# Patient Record
Sex: MALE | Race: WHITE | NOT HISPANIC OR LATINO | Employment: FULL TIME | URBAN - METROPOLITAN AREA
[De-identification: names, ages, dates, MRNs, and addresses within clinical notes are randomized per-mention and may not be internally consistent; named-entity substitution may affect disease eponyms.]

---

## 2018-04-09 ENCOUNTER — APPOINTMENT (OUTPATIENT)
Dept: RADIOLOGY | Facility: MEDICAL CENTER | Age: 23
End: 2018-04-09
Attending: STUDENT IN AN ORGANIZED HEALTH CARE EDUCATION/TRAINING PROGRAM
Payer: COMMERCIAL

## 2018-04-09 ENCOUNTER — HOSPITAL ENCOUNTER (EMERGENCY)
Facility: MEDICAL CENTER | Age: 23
End: 2018-04-09
Attending: EMERGENCY MEDICINE
Payer: COMMERCIAL

## 2018-04-09 VITALS
BODY MASS INDEX: 26.56 KG/M2 | HEART RATE: 98 BPM | DIASTOLIC BLOOD PRESSURE: 74 MMHG | OXYGEN SATURATION: 98 % | RESPIRATION RATE: 16 BRPM | SYSTOLIC BLOOD PRESSURE: 136 MMHG | TEMPERATURE: 98.3 F | HEIGHT: 74 IN | WEIGHT: 207 LBS

## 2018-04-09 DIAGNOSIS — S62.339A CLOSED BOXER'S FRACTURE, INITIAL ENCOUNTER: ICD-10-CM

## 2018-04-09 PROCEDURE — 302874 HCHG BANDAGE ACE 2 OR 3""

## 2018-04-09 PROCEDURE — 99284 EMERGENCY DEPT VISIT MOD MDM: CPT

## 2018-04-09 PROCEDURE — 29125 APPL SHORT ARM SPLINT STATIC: CPT

## 2018-04-09 PROCEDURE — 73130 X-RAY EXAM OF HAND: CPT | Mod: RT

## 2018-04-09 ASSESSMENT — PAIN SCALES - GENERAL
PAINLEVEL_OUTOF10: 5
PAINLEVEL_OUTOF10: 6

## 2018-04-09 NOTE — ED NOTES
Pt ambulated from lobby to room with a steady gait. Pt is accompanied by father. Pt resents for complaints as stated in the triage noted. Pt states he was wrestling with a family member x7 days ago and hit the wall with his R hand. Pt c/o pain primarily to the R 5th metacarpals. Pt states pain radiates up to the wrist and up the ulnar side of the R arm. Pt states to have increase in pain with palpation, movement and use of the R hand. Strong radial pulse noted. CMS intact. Cap refill <3 seconds. Swelling noted to the medial aspect of the R hand.

## 2018-04-09 NOTE — ED TRIAGE NOTES
Ambulatory to triage with   Chief Complaint   Patient presents with   • Hand Injury     Injured hand rough housing 7 days ago. C/o constant pain and swelling. 2+ edema. CMS intact.

## 2018-04-09 NOTE — PROGRESS NOTES
Pt verbalizes understanding of discharge and follow-up instructions.  VSS.  All questions answered.  Ambulates to discharge with steady gait.  Splint applied by ed tech.  Positive cms after splinting.

## 2018-04-09 NOTE — ED PROVIDER NOTES
"ED Provider Note    Scribed for Monica Diaz M.D. by Radha Patel. 4/9/2018  2:32 PM    Primary care provider: Pcp Pt States None  Means of arrival: Walk-in  History obtained from: Patient  History limited by: None    CHIEF COMPLAINT  Chief Complaint   Patient presents with   • Hand Injury     Injured hand rough housing 7 days ago. C/o constant pain and swelling. 2+ edema. CMS intact.        HPI  Kurt White is a 22 y.o. male who presents to the Emergency Department for evaluation of right hand injury sustained one week ago while boxing drunk with his cousin. He describes accidentally hitting the corner of a wall. Patient endorses swelling to this extremity with pain radiating up the wrist. He returned to work at [employer] shortly after sustaining the injury.       REVIEW OF SYSTEMS  Musculoskeletal: Swelling and pain to right hand    See history of present illness. E.     PAST MEDICAL HISTORY   has a past medical history of Rib fracture and Wrist fracture.    SURGICAL HISTORY   has a past surgical history that includes appendectomy.    SOCIAL HISTORY  Social History   Substance Use Topics   • Smoking status: Current Some Day Smoker     Packs/day: 0.50     Years: 6.00     Types: Cigarettes   • Smokeless tobacco: Never Used   • Alcohol use No      History   Drug Use   • Types: Inhaled     Comment: Marijuana       FAMILY HISTORY  History reviewed. No pertinent family history.    CURRENT MEDICATIONS  Home Medications     Reviewed by Nery Grimes R.N. (Registered Nurse) on 04/09/18 at 1422  Med List Status: Complete   Medication Last Dose Status        Patient Vinny Taking any Medications                       ALLERGIES  Allergies   Allergen Reactions   • Sulfa Drugs Rash       PHYSICAL EXAM  VITAL SIGNS: /68   Pulse (!) 113   Temp 36.8 °C (98.3 °F)   Resp 16   Ht 1.88 m (6' 2\")   Wt 93.9 kg (207 lb)   SpO2 98%   BMI 26.58 kg/m²     Constitutional: No distress  Skin: Warm, " dry  Musculoskeletal: Swelling and tenderness over the 4th and 5th metacarpal bone.  Vascular: warm to touch good capillary refill   Neurologic: distally neurovascularly intact  Psychiatric: Affect normal      DIAGNOSTIC STUDIES/PROCEDURES      RADIOLOGY  DX-HAND 3+ RIGHT   Final Result      Angulated fracture of the distal fifth metacarpal with overlying soft tissue swelling.      The radiologist's interpretation of all radiological studies have been reviewed by me.    COURSE & MEDICAL DECISION MAKING  Nursing notes, VS, PMSFHx reviewed in chart.    2:32 PM - Patient seen and examined at bedside. Ordered DX Right Hand to evaluate his symptoms.     I independently evaluated the patient and repeated the important components of the history and physical.  I discussed the management with the resident.  I have reviewed and agree with the pertinent clinical information as above including history, exam, study findings and recommendations.      4:26 PM the patient has a 5th metacarpal fracture with angulation. He will be placed in a splint and referred to outpatient orthopedics, Dr. Tony for follow-up.    Brendan Tony M.D.  [address] [facility] [address]  [phone]    Call in 1 day  to establish care, for recheck        FINAL IMPRESSION  1. Closed boxer's fracture, initial encounter          Radha FRIED ([place]), am scribing for, and in the presence of, Monica Diaz M.D..    Electronically signed by: Radha Patel (Dario), 4/9/2018    Monica FRIED M.D. personally performed the services described in this documentation, as scribed by Radha Patel in my presence, and it is both accurate and complete.    The note accurately reflects work and decisions made by me.  Monica Diaz  4/9/2018  4:28 PM

## 2018-09-11 ENCOUNTER — HOSPITAL ENCOUNTER (OUTPATIENT)
Dept: HOSPITAL 8 - ED | Age: 23
Setting detail: OBSERVATION
LOS: 9 days | Discharge: HOME | End: 2018-09-20
Attending: FAMILY MEDICINE | Admitting: FAMILY MEDICINE
Payer: COMMERCIAL

## 2018-09-11 VITALS — BODY MASS INDEX: 23.2 KG/M2 | WEIGHT: 180.78 LBS | HEIGHT: 74 IN

## 2018-09-11 DIAGNOSIS — F14.10: ICD-10-CM

## 2018-09-11 DIAGNOSIS — F32.9: ICD-10-CM
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
## 2018-09-11 DIAGNOSIS — T14.91XA: Primary | ICD-10-CM
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
## 2018-09-11 DIAGNOSIS — I10: ICD-10-CM
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
## 2018-09-11 DIAGNOSIS — F23: ICD-10-CM

## 2018-09-11 DIAGNOSIS — F10.129: ICD-10-CM

## 2018-09-11 DIAGNOSIS — G89.29: ICD-10-CM
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
## 2018-09-11 DIAGNOSIS — F17.210: ICD-10-CM

## 2018-09-11 DIAGNOSIS — Z91.5: ICD-10-CM

## 2018-09-11 LAB
ALBUMIN SERPL-MCNC: 3.9 G/DL (ref 3.4–5)
ALP SERPL-CCNC: 62 U/L (ref 45–117)
ALT SERPL-CCNC: 33 U/L (ref 12–78)
ANION GAP SERPL CALC-SCNC: 7 MMOL/L (ref 5–15)
APAP SERPL-MCNC: < 2 MCG/ML (ref 10–30)
BASOPHILS # BLD AUTO: 0.02 X10^3/UL (ref 0–0.1)
BASOPHILS NFR BLD AUTO: 0 % (ref 0–1)
BILIRUB SERPL-MCNC: 0.2 MG/DL (ref 0.2–1)
CALCIUM SERPL-MCNC: 8.3 MG/DL (ref 8.5–10.1)
CHLORIDE SERPL-SCNC: 111 MMOL/L (ref 98–107)
CREAT SERPL-MCNC: 1.05 MG/DL (ref 0.7–1.3)
CULTURE INDICATED?: NO
EOSINOPHIL # BLD AUTO: 0.19 X10^3/UL (ref 0–0.4)
EOSINOPHIL NFR BLD AUTO: 2 % (ref 1–7)
ERYTHROCYTE [DISTWIDTH] IN BLOOD BY AUTOMATED COUNT: 14 % (ref 9.4–14.8)
LYMPHOCYTES # BLD AUTO: 3.54 X10^3/UL (ref 1–3.4)
LYMPHOCYTES NFR BLD AUTO: 44 % (ref 22–44)
MCH RBC QN AUTO: 28.6 PG (ref 27.5–34.5)
MCHC RBC AUTO-ENTMCNC: 34.7 G/DL (ref 33.2–36.2)
MCV RBC AUTO: 82.3 FL (ref 81–97)
MD: NO
MICROSCOPIC: (no result)
MONOCYTES # BLD AUTO: 0.33 X10^3/UL (ref 0.2–0.8)
MONOCYTES NFR BLD AUTO: 4 % (ref 2–9)
NEUTROPHILS # BLD AUTO: 3.93 X10^3/UL (ref 1.8–6.8)
NEUTROPHILS NFR BLD AUTO: 49 % (ref 42–75)
PLATELET # BLD AUTO: 220 X10^3/UL (ref 130–400)
PMV BLD AUTO: 8.2 FL (ref 7.4–10.4)
PROT SERPL-MCNC: 7.3 G/DL (ref 6.4–8.2)
RBC # BLD AUTO: 5.14 X10^6/UL (ref 4.38–5.82)
VANCOMYCIN TROUGH SERPL-MCNC: 2.4 MG/DL (ref 2.8–20)

## 2018-09-11 PROCEDURE — 99285 EMERGENCY DEPT VISIT HI MDM: CPT

## 2018-09-11 PROCEDURE — 80329 ANALGESICS NON-OPIOID 1 OR 2: CPT

## 2018-09-11 PROCEDURE — 85025 COMPLETE CBC W/AUTO DIFF WBC: CPT

## 2018-09-11 PROCEDURE — G0480 DRUG TEST DEF 1-7 CLASSES: HCPCS

## 2018-09-11 PROCEDURE — 80053 COMPREHEN METABOLIC PANEL: CPT

## 2018-09-11 PROCEDURE — 36415 COLL VENOUS BLD VENIPUNCTURE: CPT

## 2018-09-11 PROCEDURE — G0378 HOSPITAL OBSERVATION PER HR: HCPCS

## 2018-09-11 PROCEDURE — 80307 DRUG TEST PRSMV CHEM ANLYZR: CPT

## 2018-09-11 PROCEDURE — 81003 URINALYSIS AUTO W/O SCOPE: CPT

## 2018-09-11 PROCEDURE — 96372 THER/PROPH/DIAG INJ SC/IM: CPT

## 2018-09-11 RX ADMIN — NICOTINE SCH PATCH: 21 PATCH, EXTENDED RELEASE TRANSDERMAL at 16:38

## 2018-09-12 VITALS — DIASTOLIC BLOOD PRESSURE: 90 MMHG | SYSTOLIC BLOOD PRESSURE: 137 MMHG

## 2018-09-12 VITALS — DIASTOLIC BLOOD PRESSURE: 84 MMHG | SYSTOLIC BLOOD PRESSURE: 143 MMHG

## 2018-09-12 RX ADMIN — IBUPROFEN PRN MG: 600 TABLET ORAL at 16:00

## 2018-09-12 RX ADMIN — NICOTINE SCH PATCH: 21 PATCH, EXTENDED RELEASE TRANSDERMAL at 14:24

## 2018-09-13 VITALS — DIASTOLIC BLOOD PRESSURE: 75 MMHG | SYSTOLIC BLOOD PRESSURE: 126 MMHG

## 2018-09-13 VITALS — SYSTOLIC BLOOD PRESSURE: 135 MMHG | DIASTOLIC BLOOD PRESSURE: 77 MMHG

## 2018-09-13 RX ADMIN — IBUPROFEN PRN MG: 600 TABLET ORAL at 10:43

## 2018-09-13 RX ADMIN — NICOTINE SCH PATCH: 21 PATCH, EXTENDED RELEASE TRANSDERMAL at 13:54

## 2018-09-13 RX ADMIN — ZIPRASIDONE HCL PRN MG: 20 CAPSULE ORAL at 20:13

## 2018-09-14 VITALS — DIASTOLIC BLOOD PRESSURE: 80 MMHG | SYSTOLIC BLOOD PRESSURE: 125 MMHG

## 2018-09-14 VITALS — DIASTOLIC BLOOD PRESSURE: 84 MMHG | SYSTOLIC BLOOD PRESSURE: 130 MMHG

## 2018-09-14 RX ADMIN — IBUPROFEN PRN MG: 600 TABLET ORAL at 16:08

## 2018-09-14 RX ADMIN — NICOTINE SCH PATCH: 21 PATCH, EXTENDED RELEASE TRANSDERMAL at 13:31

## 2018-09-15 VITALS — SYSTOLIC BLOOD PRESSURE: 132 MMHG | DIASTOLIC BLOOD PRESSURE: 81 MMHG

## 2018-09-15 VITALS — DIASTOLIC BLOOD PRESSURE: 66 MMHG | SYSTOLIC BLOOD PRESSURE: 126 MMHG

## 2018-09-15 RX ADMIN — ZIPRASIDONE HCL PRN MG: 20 CAPSULE ORAL at 17:25

## 2018-09-15 RX ADMIN — NICOTINE SCH PATCH: 21 PATCH, EXTENDED RELEASE TRANSDERMAL at 14:50

## 2018-09-16 VITALS — DIASTOLIC BLOOD PRESSURE: 71 MMHG | SYSTOLIC BLOOD PRESSURE: 117 MMHG

## 2018-09-16 VITALS — DIASTOLIC BLOOD PRESSURE: 82 MMHG | SYSTOLIC BLOOD PRESSURE: 132 MMHG

## 2018-09-16 RX ADMIN — ZIPRASIDONE HCL PRN MG: 20 CAPSULE ORAL at 08:38

## 2018-09-16 RX ADMIN — NICOTINE SCH PATCH: 21 PATCH, EXTENDED RELEASE TRANSDERMAL at 15:23

## 2018-09-17 VITALS — SYSTOLIC BLOOD PRESSURE: 117 MMHG | DIASTOLIC BLOOD PRESSURE: 73 MMHG

## 2018-09-17 VITALS — DIASTOLIC BLOOD PRESSURE: 70 MMHG | SYSTOLIC BLOOD PRESSURE: 114 MMHG

## 2018-09-17 RX ADMIN — ZIPRASIDONE HCL PRN MG: 20 CAPSULE ORAL at 15:37

## 2018-09-17 RX ADMIN — NICOTINE SCH PATCH: 21 PATCH, EXTENDED RELEASE TRANSDERMAL at 15:31

## 2018-09-17 RX ADMIN — ZIPRASIDONE HCL PRN MG: 20 CAPSULE ORAL at 20:17

## 2018-09-18 VITALS — SYSTOLIC BLOOD PRESSURE: 99 MMHG | DIASTOLIC BLOOD PRESSURE: 65 MMHG

## 2018-09-18 VITALS — SYSTOLIC BLOOD PRESSURE: 129 MMHG | DIASTOLIC BLOOD PRESSURE: 79 MMHG

## 2018-09-18 RX ADMIN — NICOTINE SCH PATCH: 21 PATCH, EXTENDED RELEASE TRANSDERMAL at 15:02

## 2018-09-18 RX ADMIN — ZIPRASIDONE HCL PRN MG: 20 CAPSULE ORAL at 13:40

## 2018-09-18 RX ADMIN — ZIPRASIDONE HCL PRN MG: 20 CAPSULE ORAL at 19:40

## 2018-09-19 VITALS — SYSTOLIC BLOOD PRESSURE: 125 MMHG | DIASTOLIC BLOOD PRESSURE: 77 MMHG

## 2018-09-19 VITALS — SYSTOLIC BLOOD PRESSURE: 122 MMHG | DIASTOLIC BLOOD PRESSURE: 76 MMHG

## 2018-09-19 RX ADMIN — NICOTINE SCH PATCH: 21 PATCH, EXTENDED RELEASE TRANSDERMAL at 15:06

## 2018-09-19 RX ADMIN — ZIPRASIDONE HCL PRN MG: 20 CAPSULE ORAL at 08:30

## 2018-09-19 RX ADMIN — ZIPRASIDONE HCL PRN MG: 20 CAPSULE ORAL at 20:22

## 2018-09-20 VITALS — SYSTOLIC BLOOD PRESSURE: 128 MMHG

## 2019-01-29 ENCOUNTER — HOSPITAL ENCOUNTER (EMERGENCY)
Facility: MEDICAL CENTER | Age: 24
End: 2019-01-29
Attending: EMERGENCY MEDICINE
Payer: COMMERCIAL

## 2019-01-29 VITALS
WEIGHT: 214.73 LBS | DIASTOLIC BLOOD PRESSURE: 91 MMHG | SYSTOLIC BLOOD PRESSURE: 132 MMHG | HEART RATE: 107 BPM | OXYGEN SATURATION: 98 % | RESPIRATION RATE: 16 BRPM | HEIGHT: 74 IN | TEMPERATURE: 96.8 F | BODY MASS INDEX: 27.56 KG/M2

## 2019-01-29 DIAGNOSIS — S39.012A STRAIN OF LUMBAR REGION, INITIAL ENCOUNTER: ICD-10-CM

## 2019-01-29 PROCEDURE — A9270 NON-COVERED ITEM OR SERVICE: HCPCS | Performed by: EMERGENCY MEDICINE

## 2019-01-29 PROCEDURE — 99283 EMERGENCY DEPT VISIT LOW MDM: CPT

## 2019-01-29 PROCEDURE — 700111 HCHG RX REV CODE 636 W/ 250 OVERRIDE (IP): Performed by: EMERGENCY MEDICINE

## 2019-01-29 PROCEDURE — 700102 HCHG RX REV CODE 250 W/ 637 OVERRIDE(OP): Performed by: EMERGENCY MEDICINE

## 2019-01-29 RX ORDER — ONDANSETRON 4 MG/1
4 TABLET, ORALLY DISINTEGRATING ORAL ONCE
Qty: 10 TAB | Refills: 0 | Status: SHIPPED
Start: 2019-01-29 | End: 2019-01-29

## 2019-01-29 RX ORDER — OXYCODONE HYDROCHLORIDE AND ACETAMINOPHEN 5; 325 MG/1; MG/1
1 TABLET ORAL ONCE
Status: COMPLETED | OUTPATIENT
Start: 2019-01-29 | End: 2019-01-29

## 2019-01-29 RX ORDER — DICYCLOMINE HYDROCHLORIDE 10 MG/1
10 CAPSULE ORAL
Qty: 120 CAP | Refills: 0 | Status: SHIPPED
Start: 2019-01-29 | End: 2019-04-01 | Stop reason: CLARIF

## 2019-01-29 RX ORDER — ONDANSETRON 4 MG/1
4 TABLET, ORALLY DISINTEGRATING ORAL ONCE
Status: COMPLETED | OUTPATIENT
Start: 2019-01-29 | End: 2019-01-29

## 2019-01-29 RX ADMIN — OXYCODONE AND ACETAMINOPHEN 1 TABLET: 5; 325 TABLET ORAL at 15:28

## 2019-01-29 RX ADMIN — ONDANSETRON 4 MG: 4 TABLET, ORALLY DISINTEGRATING ORAL at 15:28

## 2019-01-29 NOTE — ED TRIAGE NOTES
"Chief Complaint   Patient presents with   • Fever     x3days   • Low Back Pain   • Cough     Pt ambulated to triage with above complaints. Pt feels like his head is \"hazy\" ,denies dysuria.   "

## 2019-01-29 NOTE — ED PROVIDER NOTES
"ED Provider Note    CHIEF COMPLAINT  Chief Complaint   Patient presents with   • Fever     x3days   • Low Back Pain   • Cough       HPI  Kurt White is a 23 y.o. male here for evaluation of cough, chronic low back pain, and intermittent nausea.  The pt states that he has not been feeling well 'for the last few days.'  He has some intermittent nausea, but no vomiting.  He states that he has a long history of back pain, but denies any trauma.  He states that he has a 'pinched nerve in a disk' that is not new for him.  He has no incontinence to bowel/bladder. No urinary retention, no rectal numbness. The pt has no taken anything for his symptoms, and nothing alleviates or exacerbates them.  The pt denies any IV drug use.     PAST MEDICAL HISTORY   has a past medical history of Rib fracture and Wrist fracture.    SOCIAL HISTORY  Social History     Social History Main Topics   • Smoking status: Current Some Day Smoker     Packs/day: 0.50     Years: 6.00     Types: Cigarettes   • Smokeless tobacco: Never Used   • Alcohol use No   • Drug use: Yes     Types: Inhaled      Comment: Marijuana   • Sexual activity: Not on file       SURGICAL HISTORY   has a past surgical history that includes appendectomy.    CURRENT MEDICATIONS  Home Medications     Reviewed by Rose Chadwick R.N. (Registered Nurse) on 01/29/19 at 1447  Med List Status: Complete   Medication Last Dose Status        Patient Vinny Taking any Medications                       ALLERGIES  Allergies   Allergen Reactions   • Sulfa Drugs Rash       REVIEW OF SYSTEMS  See HPI for further details. Review of systems as above, otherwise all other systems are negative.     PHYSICAL EXAM  VITAL SIGNS: /91   Pulse (!) 107   Temp 36 °C (96.8 °F) (Temporal)   Resp 16   Ht 1.88 m (6' 2\")   Wt 97.4 kg (214 lb 11.7 oz)   SpO2 98%   BMI 27.57 kg/m²     Constitutional: Well developed, well nourished. mild acute distress.  HEENT: Normocephalic, atraumatic. " MMM  Neck: Supple, Full range of motion.   Chest/Pulmonary:  No respiratory distress.  Equal expansion   Back: tenderness to the L2,L3 midline.  No obvious step off.   Musculoskeletal: No deformity, no edema, neurovascular intact.   Neuro: Clear speech, appropriate, cooperative, cranial nerves II-XII grossly intact.  Steady, unassisted gait.   Psych: Normal mood and affect      PROCEDURES     MEDICAL RECORD  I have reviewed patient's medical record and pertinent results are listed above.    COURSE & MEDICAL DECISION MAKING  I have reviewed any medical record information, laboratory studies and radiographic results as noted above.    3:22 PM  The pt had declined any blood work secondary to 'not liking needles.'  He would prefer some anti nausea medications, and something for his body aches, and would like to wait on blood work.    4:22 PM  The patient and I discussed the need for some blood work, as I do not know what is causing his symptoms.  He has declined, and states that he would like to come back if he has any other concerns.  He states he feels much better after the Zofran and pain medication.  We discussed needing to possibly look at an epidural abscess if he truly has back pain and intermittent fevers over the last few days.  He understands, and states he will return if he feels the need.  He is afebrile, nontoxic-appearing, and has no incontinence to bowel bladder, no urinary ambulance with a steady gait, and his significant other is present    I you have had any blood pressure issues while here in the emergency department, please see your doctor for a further evaluation or work up.    Differential diagnoses include but not limited to: Back strain, URI, pneumonia, epidural abscess,    This patient presents with viral illness and low back strain.  At this time, I have counseled the patient/family regarding their medications, pain control, and follow up.  They will continue their medications, if any, as  prescribed.  They will return immediately for any worsening symptoms and/or any other medical concerns.  They will see their doctor, or contact the doctor provided, in 1-2 days for follow up.       FINAL IMPRESSION  Viral illness  Low back strain      Electronically signed by: Luther Sultana, 1/29/2019 3:15 PM

## 2019-04-01 ENCOUNTER — HOSPITAL ENCOUNTER (EMERGENCY)
Facility: MEDICAL CENTER | Age: 24
End: 2019-04-01
Attending: EMERGENCY MEDICINE
Payer: COMMERCIAL

## 2019-04-01 VITALS
HEIGHT: 74 IN | WEIGHT: 210 LBS | SYSTOLIC BLOOD PRESSURE: 122 MMHG | OXYGEN SATURATION: 95 % | BODY MASS INDEX: 26.95 KG/M2 | DIASTOLIC BLOOD PRESSURE: 80 MMHG | RESPIRATION RATE: 16 BRPM | HEART RATE: 85 BPM | TEMPERATURE: 98 F

## 2019-04-01 DIAGNOSIS — F10.920 ALCOHOLIC INTOXICATION WITHOUT COMPLICATION (HCC): ICD-10-CM

## 2019-04-01 DIAGNOSIS — R45.851 SUICIDAL IDEATION: ICD-10-CM

## 2019-04-01 LAB
ALBUMIN SERPL BCP-MCNC: 4.4 G/DL (ref 3.2–4.9)
ALBUMIN/GLOB SERPL: 1.8 G/DL
ALP SERPL-CCNC: 47 U/L (ref 30–99)
ALT SERPL-CCNC: 24 U/L (ref 2–50)
AMPHET UR QL SCN: NEGATIVE
ANION GAP SERPL CALC-SCNC: 11 MMOL/L (ref 0–11.9)
APAP SERPL-MCNC: <10 UG/ML (ref 10–30)
AST SERPL-CCNC: 28 U/L (ref 12–45)
BARBITURATES UR QL SCN: NEGATIVE
BASOPHILS # BLD AUTO: 0.9 % (ref 0–1.8)
BASOPHILS # BLD: 0.05 K/UL (ref 0–0.12)
BENZODIAZ UR QL SCN: NEGATIVE
BILIRUB SERPL-MCNC: 0.2 MG/DL (ref 0.1–1.5)
BUN SERPL-MCNC: 12 MG/DL (ref 8–22)
BZE UR QL SCN: NEGATIVE
CALCIUM SERPL-MCNC: 9.1 MG/DL (ref 8.5–10.5)
CANNABINOIDS UR QL SCN: POSITIVE
CHLORIDE SERPL-SCNC: 109 MMOL/L (ref 96–112)
CO2 SERPL-SCNC: 20 MMOL/L (ref 20–33)
CREAT SERPL-MCNC: 0.93 MG/DL (ref 0.5–1.4)
EOSINOPHIL # BLD AUTO: 0.19 K/UL (ref 0–0.51)
EOSINOPHIL NFR BLD: 3.3 % (ref 0–6.9)
ERYTHROCYTE [DISTWIDTH] IN BLOOD BY AUTOMATED COUNT: 41.3 FL (ref 35.9–50)
ETHANOL BLD-MCNC: 0.17 G/DL
GLOBULIN SER CALC-MCNC: 2.4 G/DL (ref 1.9–3.5)
GLUCOSE SERPL-MCNC: 109 MG/DL (ref 65–99)
HCT VFR BLD AUTO: 44.2 % (ref 42–52)
HGB BLD-MCNC: 14.3 G/DL (ref 14–18)
IMM GRANULOCYTES # BLD AUTO: 0.01 K/UL (ref 0–0.11)
IMM GRANULOCYTES NFR BLD AUTO: 0.2 % (ref 0–0.9)
LYMPHOCYTES # BLD AUTO: 3.23 K/UL (ref 1–4.8)
LYMPHOCYTES NFR BLD: 55.8 % (ref 22–41)
MCH RBC QN AUTO: 27.8 PG (ref 27–33)
MCHC RBC AUTO-ENTMCNC: 32.4 G/DL (ref 33.7–35.3)
MCV RBC AUTO: 86 FL (ref 81.4–97.8)
METHADONE UR QL SCN: NEGATIVE
MONOCYTES # BLD AUTO: 0.35 K/UL (ref 0–0.85)
MONOCYTES NFR BLD AUTO: 6 % (ref 0–13.4)
NEUTROPHILS # BLD AUTO: 1.96 K/UL (ref 1.82–7.42)
NEUTROPHILS NFR BLD: 33.8 % (ref 44–72)
NRBC # BLD AUTO: 0 K/UL
NRBC BLD-RTO: 0 /100 WBC
OPIATES UR QL SCN: POSITIVE
OXYCODONE UR QL SCN: NEGATIVE
PCP UR QL SCN: NEGATIVE
PLATELET # BLD AUTO: 191 K/UL (ref 164–446)
PMV BLD AUTO: 10.4 FL (ref 9–12.9)
POC BREATHALIZER: 0.02 PERCENT (ref 0–0.01)
POC BREATHALIZER: 0.05 PERCENT (ref 0–0.01)
POTASSIUM SERPL-SCNC: 4.2 MMOL/L (ref 3.6–5.5)
PROPOXYPH UR QL SCN: NEGATIVE
PROT SERPL-MCNC: 6.8 G/DL (ref 6–8.2)
RBC # BLD AUTO: 5.14 M/UL (ref 4.7–6.1)
SALICYLATES SERPL-MCNC: 0 MG/DL (ref 15–25)
SODIUM SERPL-SCNC: 140 MMOL/L (ref 135–145)
WBC # BLD AUTO: 5.8 K/UL (ref 4.8–10.8)

## 2019-04-01 PROCEDURE — 302970 POC BREATHALIZER

## 2019-04-01 PROCEDURE — 99284 EMERGENCY DEPT VISIT MOD MDM: CPT | Performed by: PSYCHIATRY & NEUROLOGY

## 2019-04-01 PROCEDURE — 302970 POC BREATHALIZER: Performed by: EMERGENCY MEDICINE

## 2019-04-01 PROCEDURE — 80307 DRUG TEST PRSMV CHEM ANLYZR: CPT

## 2019-04-01 PROCEDURE — 80053 COMPREHEN METABOLIC PANEL: CPT

## 2019-04-01 PROCEDURE — 85025 COMPLETE CBC W/AUTO DIFF WBC: CPT

## 2019-04-01 PROCEDURE — 99285 EMERGENCY DEPT VISIT HI MDM: CPT

## 2019-04-01 NOTE — CONSULTS
Alert Team  Per Dr. Mullins, can defer AT assessment in lieu of psychiatry assessment this morning.  Pt remains on AT services for any psych needs.

## 2019-04-01 NOTE — ED NOTES
"Pt ambulatory to ed c ems c L2K by pd, states he wants to kill himself. When asked if hes ever felt this way before or had a suicide attempt in the past \"too many to count\". Pt refusing to answer most questions. States +etoh tonight & percocet \"whatever I can get my hands on\". Security outside of room d/t pt refusing to change. Belongs searched. PASCUAL 0.050, ambulatory to restroom c steady gait for uds, obtained & sent. Tbs.   "

## 2019-04-01 NOTE — ED PROVIDER NOTES
"ED Provider Note    ED Provider Note      Primary care provider: Pcp Pt States None    CHIEF COMPLAINT  Chief Complaint   Patient presents with   • Legal 2000     from home. stated he wanted to kill himself. seen at [facility] 2 months ago for same.    • Alcohol Intoxication     states 3 beers tonight       HPI  Kurt White is a 23 y.o. male who presents to the Emergency Department with chief complaint of suicidal ideation.  Patient reports drinking several beers taking Xanax and Percocet prior to arrival tonight.  Patient reports that he is having suicidal thoughts this evening as a result of \"life\".  Patient refuses to answer any further questions about what in life is causing him stress and states that we are just going to do nothing for him like they always do.  Further history of present illness limited by alcohol intoxication probable benzodiazepine and narcotic intoxication as well as patient noncompliance.    REVIEW OF SYSTEMS  As per HPI otherwise limited by altered mental status    PAST MEDICAL HISTORY   has a past medical history of Rib fracture and Wrist fracture.    SURGICAL HISTORY   has a past surgical history that includes appendectomy.    SOCIAL HISTORY  Social History   Substance Use Topics   • Smoking status: Current Some Day Smoker     Packs/day: 0.50     Years: 6.00     Types: Cigarettes   • Smokeless tobacco: Never Used   • Alcohol use No      History   Drug Use   • Types: Inhaled     Comment: Marijuana       FAMILY HISTORY  Non-Contributory    CURRENT MEDICATIONS  None    ALLERGIES  Allergies   Allergen Reactions   • Sulfa Drugs Rash       PHYSICAL EXAM  VITAL SIGNS: /81   Pulse (!) 104   Temp 36.6 °C (97.9 °F) (Temporal)   Resp 16   Ht 1.88 m (6' 2\")   Wt 95.3 kg (210 lb)   BMI 26.96 kg/m²   Pulse ox interpretation: I interpret this pulse ox as normal.  Constitutional: Alert and oriented x 3, no acute distress,  disheveled unkempt  HEENT: Atraumatic normocephalic, " pupils are equal round reactive to light extraocular movements are intact. The nares is clear, external ears are normal, mouth shows moist mucous membranes  Neck: Supple, no JVD no tracheal deviation  Cardiovascular: Noted to be tachycardic on triage vitals not on physical exam no murmur rub or gallop 2+ pulses peripherally x4  Thorax & Lungs: No respiratory distress, no wheezes rales or rhonchi, No chest tenderness.   GI: Soft nontender nondistended positive bowel sounds, no peritoneal signs  Skin: Warm dry no acute rash or lesion  Musculoskeletal: Moving all extremities with full range and 5 of 5 strength, no acute  deformity  Neurologic: Noncompliant with examination moving all extremities equally  Psychiatric: Agitated altered and appears intoxicated      DIAGNOSTIC STUDIES / PROCEDURES  LABS      Results for orders placed or performed during the hospital encounter of 04/01/19   Urine Drug Screen   Result Value Ref Range    Amphetamines Urine Negative Negative    Barbiturates Negative Negative    Benzodiazepines Negative Negative    Cocaine Metabolite Negative Negative    Methadone Negative Negative    Opiates Positive (A) Negative    Oxycodone Negative Negative    Phencyclidine -Pcp Negative Negative    Propoxyphene Negative Negative    Cannabinoid Metab Positive (A) Negative   POC BREATHALIZER   Result Value Ref Range    POC Breathalizer 0.050 (A) 0.00 - 0.01 Percent       All labs reviewed by me.        COURSE & MEDICAL DECISION MAKING  Pertinent Labs & Imaging studies reviewed. (See chart for details)    2:06 AM - Patient seen and examined at bedside.         Patient noted to have slightly elevated blood pressure likely circumstantial secondary to presenting complaint. Referred to primary care physician for further evaluation.      Medical Decision Making: Patient's altered obviously intoxicated pliant with examination.  Basic labs were obtained patient will be observed to clinical sobriety and reevaluated  "for ongoing suicidal ideation.  Patient was discussed with oncoming practitioner who will follow up and reevaluate.    /81   Pulse (!) 104   Temp 36.6 °C (97.9 °F) (Temporal)   Resp 16   Ht 1.88 m (6' 2\")   Wt 95.3 kg (210 lb)   BMI 26.96 kg/m²       FINAL IMPRESSION  1.  Suicidal ideation  2.  Alcohol intoxication  3.  Benzodiazepine abuse  4.  Narcotic abuse      This dictation has been created using voice recognition software and/or scribes. The accuracy of the dictation is limited by the abilities of the software and the expertise of the scribes. I expect there may be some errors of grammar and possibly content. I made every attempt to manually correct the errors within my dictation. However, errors related to voice recognition software and/or scribes may still exist and should be interpreted within the appropriate context.            "

## 2019-04-01 NOTE — PSYCHIATRY
BRIEF PSYCHIATRIC CONSULT NOTE: patient seen, full note to follow. Legal hold dc'd. Script for zoloft 50 mg. Risks/benefits. Referrals.

## 2019-04-01 NOTE — ED NOTES
Med Rec completed per patient  Allergies reviewed  No ORAL antibiotics in last 30 days    Patient jamila taking any medications at home

## 2019-04-01 NOTE — ED NOTES
"Pt initially refusing lab draw.. \"I hate needles, I should of just stayed home and actually did it this time:\" piv esbt. Labs drawn  & sent. Tolerated well. Pt refusing blankets. Sitter outside of room. Will ctm.   "

## 2019-04-01 NOTE — DISCHARGE PLANNING
Alert Team  Per MD request, pt provided with resource lists for psychiatry, counseling and alcohol use tx.  Provided CSP for patient to fill out.    Pt to DC to self.

## 2019-04-01 NOTE — ED NOTES
Patient was educated on discharge instructions.  Patient was informed about diagnosis, symptom management, risks, and home care instructions.  Patient verbalized understanding and signed discharge instructions. Prescription sent to pharmacy. Copy of discharge instructions in chart.  Patient ambulated out with steady gait.  Patient has personal belongings and PIV removed, tip intact.

## 2019-04-01 NOTE — ED PROVIDER NOTES
"ED PROVIDER NOTE    Scribed for Caleb Douglas M.D. by Paul Gonsalez. 4/1/2019, 6:43 AM.    This is an addendum to the note on Krut White.     6:23 AM - I discussed the patient's case with Dr. Salter ([facility]) who will transfer care of the patient to me at this time.        6:44 AM - Patient reevaluated who is sleeping but arousable to verbal stimuli. Heart and lungs are normal. /81   Pulse (!) 104   Temp 36.6 °C (97.9 °F) (Temporal)   Resp 16   Ht 1.88 m (6' 2\")   Wt 95.3 kg (210 lb)   BMI 26.96 kg/m²        IPaul (Scribe), am scribing for, and in the presence of, Caleb Douglas M.D..    Electronically signed by: Paul Gonsalez (Scribe), 4/1/2019    ICaleb M.D. personally performed the services described in this documentation, as scribed by Paul Gonsalez in my presence, and it is both accurate and complete.    The note accurately reflects work and decisions made by me.  Caleb Douglas  4/1/2019  2:21 PM          "

## 2019-04-01 NOTE — ED NOTES
Break RN  Pt resting In bed, remains calm. Sitter outside the room watching pt. No acute changes noted.

## 2019-04-02 ASSESSMENT — ENCOUNTER SYMPTOMS
MUSCULOSKELETAL NEGATIVE: 1
CARDIOVASCULAR NEGATIVE: 1
NEUROLOGICAL NEGATIVE: 1
DEPRESSION: 1
CONSTITUTIONAL NEGATIVE: 1
EYES NEGATIVE: 1
RESPIRATORY NEGATIVE: 1
GASTROINTESTINAL NEGATIVE: 1

## 2019-04-02 NOTE — PSYCHIATRY
"PSYCHIATRIC INTAKE EVALUATION     *Reason for admission:  presents to the Emergency Department with chief complaint of suicidal ideation.  Patient reports drinking several beers taking Xanax and Percocet prior to arrival tonight.  Patient reports that he is having suicidal thoughts this evening as a result of \"life\".   Further history of present illness limited by alcohol intoxication probable benzodiazepine and narcotic intoxication as well as patient noncompliance.                *Reason for consult:  suicidal      *Requesting Physician/APN:Silverio Perez M.D.                Legal Hold on admission:  +          *Chief Complaint: no longer suicidal       *HPI (includes Psychiatric ROS): 22 yo male who is now sober and no longer SI. He feels he is chronically depressed independently of his alcohol use though he denies any period of sobriety more than one day since he began drinking which was around age 11. He drinks because he likes the feeling. He is not sure if he is wanting to go to a rehab and/or stop drinking. However he would like to try an antidepressant.     He is unable to give associated symptom such as changes in sleep, appetite etc to the depression.  He denies feeling anxious.     Psychosis: denies  Arina: denies      *Medical Review Of Symptoms (not dx conditions):   Review of Systems   Constitutional: Negative.    HENT: Negative.    Eyes: Negative.    Respiratory: Negative.    Cardiovascular: Negative.    Gastrointestinal: Negative.    Genitourinary: Negative.    Musculoskeletal: Negative.    Skin: Negative.    Neurological: Negative.    Endo/Heme/Allergies: Negative.    Psychiatric/Behavioral: Positive for depression.     *Psychiatric Examination:   Vitals:Blood pressure 122/80, pulse 85, temperature 36.7 °C (98 °F), temperature source Temporal, resp. rate 16, height 1.88 m (6' 2\"), weight 95.3 kg (210 lb), SpO2 95 %.  General Appearance: tattoos, normal habitus, intermittent eye contact, " "partially cooperative.  Abnormal Movements:none noted  Gait and Posture:not observed  Speech: wnl  Though Process: normal rate  Associations:linear  Abnormal or Psychotic Thoughts:none  Judgement and Insight: poor  Orientation:grossly oriented  Recent and Remote Memory:grossly intact  Attention Span and Concentration:intact  Language:fluid  Fund of Knowledge:not tested  Mood and Affect:\"depressed\"/blunted  SI/HI: denies     *PAST MEDICAL/PSYCH/FAMILY/SOCIAL(as reported by patient):         *medical hx:        TBI:denies  SZ:denies  Stroke: denies  Past Medical History:   Diagnosis Date   • Rib fracture    • Wrist fracture      Past Surgical History:   Procedure Laterality Date   • APPENDECTOMY        *psychiatric hx:   SAs: multiple attempts but never hospitalized in a psych facility though says that last year, after attempting to hang himself he was hospitalized at [facility] for a week.  Guns:denies  Hx of Violence:has been arrested for battery     Med Hx:denies     *family Psych hx:  +substance abuse. Negative for mental illness     *social hx:  Alcohol: daily, began at the age of 11.   Drugs:THC and sober from it x 3 years     *MEDICAL HX: labs, MARS, medications, etc were reviewed. Only those findings of potential interest to psychiatry are noted below:    *Current Medical issues:  None acutely      *Allergies:  Allergies   Allergen Reactions   • Sulfa Drugs Rash     *Current Medications:  No current facility-administered medications for this encounter.     Current Outpatient Prescriptions:   •  sertraline (ZOLOFT) 50 MG Tab, Take 1 Tab by mouth every day., Disp: 30 Tab, Rfl: 0  *EKG: none  *Imaging: none   EEG:  none     *Labs:  Recent Labs      04/01/19   0215   WBC  5.8   RBC  5.14   HEMOGLOBIN  14.3   HEMATOCRIT  44.2   MCV  86.0   MCH  27.8   RDW  41.3   PLATELETCT  191   MPV  10.4   NEUTSPOLYS  33.80*   LYMPHOCYTES  55.80*   MONOCYTES  6.00   EOSINOPHILS  3.30   BASOPHILS  0.90     Lab Results   Component " "Value Date/Time    SODIUM 140 04/01/2019 02:15 AM    POTASSIUM 4.2 04/01/2019 02:15 AM    CHLORIDE 109 04/01/2019 02:15 AM    CO2 20 04/01/2019 02:15 AM    GLUCOSE 109 (H) 04/01/2019 02:15 AM    BUN 12 04/01/2019 02:15 AM    CREATININE 0.93 04/01/2019 02:15 AM           Lab Results  Component Value Date/Time   BREATHALIZER 0.018 (A) 04/01/2019 0916   BAL 0.17  No components found for: BLOODALCOHOL     Lab Results  Component Value Date/Time   AMPHUR Negative 04/01/2019 0144   BARBSURINE Negative 04/01/2019 0144   BENZODIAZU Negative 04/01/2019 0144   COCAINEMET Negative 04/01/2019 0144   METHADONE Negative 04/01/2019 0144   OPIATES Positive (A) 04/01/2019 0144   OXYCODN Negative 04/01/2019 0144   PCPURINE Negative 04/01/2019 0144   PROPOXY Negative 04/01/2019 0144   CANNABINOID Positive (A) 04/01/2019 0144     No results found for: TSH, FREET4      *ASSESSMENT/PLAN:  1. Alcohol use disorder: severe  -discussed med options: naloxone etc  -pt told he has to follow up if he truly wants to get better: he has been given follow up recommendations and doesn't do so. He says he doesn't know why. Told that the outpt part (for depression as well) is his responsibility.           2.  Depressive disorder unspec    - R/O alcohol induced  -pt given the benefit of the doubt and given a script for zoloft 50 mg. Pt told that it is unlikely to work if he continues to drink. The risk of using zoloft is low but, if it does help and if his \"depression\" in any way drives his drinking, he may benefit.    3. THC use disorder      Legal hold:   Dc'd.   *Signing off  *Thank you for the consult    "